# Patient Record
Sex: FEMALE | Race: WHITE
[De-identification: names, ages, dates, MRNs, and addresses within clinical notes are randomized per-mention and may not be internally consistent; named-entity substitution may affect disease eponyms.]

---

## 2021-01-22 ENCOUNTER — HOSPITAL ENCOUNTER (EMERGENCY)
Dept: HOSPITAL 11 - JP.ED | Age: 86
Discharge: HOME | End: 2021-01-22
Payer: MEDICARE

## 2021-01-22 DIAGNOSIS — I10: ICD-10-CM

## 2021-01-22 DIAGNOSIS — Z79.899: ICD-10-CM

## 2021-01-22 DIAGNOSIS — Z88.0: ICD-10-CM

## 2021-01-22 DIAGNOSIS — Z88.2: ICD-10-CM

## 2021-01-22 DIAGNOSIS — S20.211A: Primary | ICD-10-CM

## 2021-01-22 DIAGNOSIS — Z88.1: ICD-10-CM
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
## 2021-01-22 NOTE — EDM.PDOC
ED HPI GENERAL MEDICAL PROBLEM





- General


Chief Complaint: General


Stated Complaint: MEDICAL VIA NORTH


Time Seen by Provider: 01/22/21 16:56


Source of Information: Reports: Patient


History Limitations: Reports: No Limitations





- History of Present Illness


INITIAL COMMENTS - FREE TEXT/NARRATIVE: 





pt was exercising in her apartment and she lost her balance and fell. She hit 

her rt chest and is having alot of pain when she does deep breathes. 


Onset: Today, Sudden


Duration: Hour(s):


Location: Reports: Chest


Associated Symptoms: Reports: No Other Symptoms


  ** Right Middle Chest


Pain Score (Numeric/FACES): 10





- Related Data


                                    Allergies











Allergy/AdvReac Type Severity Reaction Status Date / Time


 


metronidazole [From Flagyl] Allergy  Paralysis Verified 04/14/14 07:45


 


Metronidazole HCl Allergy  Paralysis Verified 04/14/14 07:45





[From Flagyl]     


 


Penicillins Allergy  Cannot Verified 04/14/14 07:45





   Remember  


 


sulfamethoxazole Allergy  Hives Verified 04/14/14 07:45





[From Septra]     


 


trimethoprim [From Septra] Allergy  Hives Verified 04/14/14 07:45











Home Meds: 


                                    Home Meds





Hydrochlorothiazide/Lisinopril [Lisinopril-HCTZ 20-25 MG] 1 tab PO DAILY 

04/11/14 [History]


Metoprolol Tartrate [Lopressor] 50 mg PO BID 04/11/14 [History]


Omega-3-Acid Ethyl Esters [Lovaza] 1 tab PO DAILY 04/11/14 [History]


allopurinoL [Allopurinol] 100 mg PO DAILY 04/11/14 [History]


Cyclobenzaprine [Flexeril] 5 mg PO QID 01/22/21 [History]











Past Medical History


Cardiovascular History: Reports: Hypertension





- Infectious Disease History


Infectious Disease History: Reports: Chicken Pox, Measles, Mumps





Social & Family History





- Tobacco Use


Tobacco Use Status *Q: Never Tobacco User





- Caffeine Use


Caffeine Use: Reports: None





- Recreational Drug Use


Recreational Drug Use: No





ED ROS GENERAL





- Review of Systems


Review Of Systems: See Below


Constitutional: Reports: No Symptoms


HEENT: Reports: No Symptoms


Respiratory: Reports: Shortness of Breath, Other (pain with deep breathing. )


Cardiovascular: Reports: No Symptoms


Endocrine: Reports: No Symptoms


GI/Abdominal: Reports: No Symptoms


: Reports: No Symptoms


Musculoskeletal: Reports: Other ( tender over the rt chest. )


Skin: Reports: No Symptoms





ED EXAM, GENERAL





- Physical Exam


Exam: See Below


Free Text/Narrative:: 


t when she moves or takes a deep breath. 


Exam Limited By: No Limitations


General Appearance: Alert, Anxious, Moderate Distress


Ears: Normal TMs


Nose: Normal Inspection


Throat/Mouth: Normal Inspection


Head: Atraumatic


Neck: Normal Inspection


Respiratory/Chest: Splinting


Cardiovascular: Regular Rate, Rhythm


GI/Abdominal: Soft, Non-Tender


 (Female) Exam: Deferred


Rectal (Female) Exam: Deferred


Back Exam: Normal Inspection


Extremities: Normal Inspection





Course





- Vital Signs


Last Recorded V/S: 


                                Last Vital Signs











Temp  35.9 C L  01/22/21 16:38


 


Pulse  60   01/22/21 17:44


 


Resp  15   01/22/21 17:44


 


BP  171/78 H  01/22/21 17:44


 


Pulse Ox  96   01/22/21 17:44














- Orders/Labs/Meds


Orders: 


                               Active Orders 24 hr











 Category Date Time Status


 


 Ribs 2V w Chest Rt [CR] Stat Exams  01/22/21 16:53 Taken











Meds: 


Medications














Discontinued Medications














Generic Name Dose Route Start Last Admin





  Trade Name Yaredq  PRN Reason Stop Dose Admin


 


Ketorolac Tromethamine  60 mg  01/22/21 17:31  01/22/21 17:44





  Toradol  IM  01/22/21 17:32  60 mg





  ONETIME ONE   Administration














- Re-Assessments/Exams


Free Text/Narrative Re-Assessment/Exam: 





01/22/21 18:02


rib detail did not reveal any definite rib fractures obviously she could have a 

hairline fracture. 





Departure





- Departure


Time of Disposition: 17:56


Disposition: Home, Self-Care 01


Condition: Fair


Clinical Impression: 


 Chest wall contusion








- Discharge Information


Instructions:  Contusion, Easy-to-Read


Referrals: 


PCP,None [Primary Care Provider] - 


Forms:  ED Department Discharge


Care Plan Goals: 


cool pack to rt ant cest, tylenol 3 1 tab q6h prn for pain, pain meds can cause 

constipation, use prunes.  to keep stools soft.  encourage deep breathing, 

insentive spirometer. 





Sepsis Event Note (ED)





- Evaluation


Sepsis Screening Result: No Definite Risk





- Focused Exam


Vital Signs: 


                                   Vital Signs











  Temp Pulse Resp BP Pulse Ox


 


 01/22/21 17:44   60  15  171/78 H  96


 


 01/22/21 17:15   57 L   162/76 H 


 


 01/22/21 16:38  35.9 C L  70  17  168/86 H  96


 


 01/22/21 16:35  35.9 C L  70  17  168/86 H  96














- My Orders


Last 24 Hours: 


My Active Orders





01/22/21 16:53


Ribs 2V w Chest Rt [CR] Stat 














- Assessment/Plan


Last 24 Hours: 


My Active Orders





01/22/21 16:53


Ribs 2V w Chest Rt [CR] Stat

## 2021-01-24 ENCOUNTER — HOSPITAL ENCOUNTER (INPATIENT)
Dept: HOSPITAL 11 - JP.ED | Age: 86
LOS: 1 days | Discharge: NURSING, REHAB OR LONG-TERM CARE | DRG: 551 | End: 2021-01-25
Attending: INTERNAL MEDICINE | Admitting: FAMILY MEDICINE
Payer: MEDICARE

## 2021-01-24 DIAGNOSIS — I21.4: ICD-10-CM

## 2021-01-24 DIAGNOSIS — E78.5: ICD-10-CM

## 2021-01-24 DIAGNOSIS — Z88.0: ICD-10-CM

## 2021-01-24 DIAGNOSIS — E78.00: ICD-10-CM

## 2021-01-24 DIAGNOSIS — M19.90: ICD-10-CM

## 2021-01-24 DIAGNOSIS — E87.1: ICD-10-CM

## 2021-01-24 DIAGNOSIS — Z88.8: ICD-10-CM

## 2021-01-24 DIAGNOSIS — Z88.2: ICD-10-CM

## 2021-01-24 DIAGNOSIS — M85.80: ICD-10-CM

## 2021-01-24 DIAGNOSIS — M10.9: ICD-10-CM

## 2021-01-24 DIAGNOSIS — S22.080A: ICD-10-CM

## 2021-01-24 DIAGNOSIS — Z66: ICD-10-CM

## 2021-01-24 DIAGNOSIS — R00.1: ICD-10-CM

## 2021-01-24 DIAGNOSIS — I10: ICD-10-CM
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
## 2021-01-24 DIAGNOSIS — K21.9: ICD-10-CM

## 2021-01-24 DIAGNOSIS — S22.070A: Primary | ICD-10-CM

## 2021-01-24 NOTE — HP
IDENTIFYING DATA:  Salena Pompa is an 87-year-old   female from [place], [place].

 

CHIEF COMPLAINT:  Back pain.

 

HISTORY OF PRESENT ILLNESS:  An elderly female who lives independently in a senior apartment

dwelling, has had episodes of self-reported dizziness and recurrent falls without loss of

consciousness.  General unsteadiness and weakness are noted with occasional use of a walker

as an ambulatory aid.  She was seen earlier in the week with a fall and right chest

contusion, without bony fractures.  She had a fall today to a supine position and presented

with mid to lower back pain.  She has had no radiation of pain to the cervical area, upper

or lower extremities.  No paresthesias noted.  Chronic mild stiffness of the neck is of long-

standing duration.  Given pain and inability to care for self, she was transported by

[facility] to the emergency room for evaluation.

 

PAST MEDICAL HISTORY:  Previous surgeries include bilateral cataract extraction and tubal

ligation.

 

Additional health problems include hyperlipidemia, hypertension, osteopenia, and

osteoarthritis.

 

HABITS:  Nonsmoker.  No alcohol use.  No regular use of caffeinated beverages.

 

ALLERGIES:  REPORTED TO METRONIDAZOLE, PENICILLIN, AND SULFAMETHOXAZOLE.

 

 

CURRENT MEDICATIONS:  Lisinopril with hydrochlorothiazide 20/25 mg daily, metoprolol

tartrate 50 mg b.i.d., omega-3 fatty acid tablets 1 daily, allopurinol 100 mg daily for

gouty arthritis, cyclobenzaprine 5 mg q.i.d. p.r.n., and acetaminophen p.r.n. arthritic

pain.

 

IMMUNIZATIONS:  She generally refuses influenza vaccines.  Reports she has received previous

pneumococcal vaccine and 1 of 2 COVID vaccines today.

 

SOCIAL HISTORY:  Currently resides at [facility].  She has home

care and nursing assistance provided by [facility] with weekly nursing visits and monthly

cleaning provided.  She no longer drives.  She does have an attendant to provide

transportation for appointments and grocery shopping.  She reports she does her own laundry

and prepares 2 of 3 meals daily, participating in provided lunch.

 

FAMILY HISTORY:  Children do not live in the immediate area.  No close relatives in the [place] region at the current time.

 

REVIEW OF SYSTEMS:  NEUROLOGIC:  Does admit to mild memory loss and denies significant

hearing decline.  No history of stroke, seizures, headaches, or focal weakness.  Status post

cataract extraction.  She is able to read standard print.

CARDIAC:  History of hypertension and hyperlipidemia.  Denies history of diabetes, MI,

angina-like pain, or syncope.  She reports a history of childhood murmur.

RESPIRATORY:  No history of tuberculosis, chronic asthmatic lung disease, COPD, or noted

COVID disease.  No cough, sputum production, or shortness of breath.

GASTROINTESTINAL:  Denies dyspepsia, hepatitis, jaundice, gallbladder disease, or bowel

changes.  No melena or hematochezia.  Occasional intermittent diarrhea noted with ingestion

of fatty foods.

GENITOURINARY:  No urinary incontinence.  Records suggest a history renal insufficiency.

MUSCULOSKELETAL:  Arthralgias at the knees, chronic mild cervical stiffness, and new onset

of mid to low back pain.

 

PHYSICAL EXAMINATION:

GENERAL:  Appearance is that of an elderly female, now appearing comfortable, lying in her

hospital bed.

VITAL SIGNS:  Initial vitals; temperature 98.7 degrees Fahrenheit, pulse 91, respiratory

rate 20, blood pressure 180/78, and O2 sats 93% on room air.

HEENT:  Hearing is intact with normal canals and TMs.  Extraocular eye movements are

symmetrical.  Sclerae are anicteric.  No nasal congestion.  No facial asymmetries or trauma.

NECK:  Brisk, regular carotid pulses.  No bruits, JVD, or thyromegaly.

LUNGS:  Symmetrical, clear, resonant, and non-tachypneic.

HEART:  Regular without murmurs or gallops currently noted.

ABDOMEN:  Soft, nontender, and nondistended.  No organomegaly.  Active sounds.  Good femoral

pulses.  No abdominal bruits.  No CVA pain.

EXTREMITIES:  Good radial, posterior tibial, and dorsal pedal pulses.  No pitting edema.  No

ischemic skin changes.  No contusions or open skin lesions.

NEUROLOGIC:  Symmetrical strength.  Some distractibility and difficulty engaging in

conversation secondary to immediate recall and short-term memory deficit.  No agitation,

anxiety, or depressive symptoms.

 

IMAGING:  CT imaging of the thoracolumbar spine on admission suggest recent thoracic

vertebral compression fractures, likely new in presentation.

 

LABORATORY DATA:  Labs of January 2021 include a creatinine is 0.98, cholesterol 161, LDL

88, glucose 95, potassium 4, and BUN 18.  WBC 9.1 and hemoglobin 15.3.  Sodium 132.

 

IMPRESSION:

1. Fall with thoracic vertebral compression fractures and back pain.

2. Recurrent falls, unknown etiology.  The patient reports episodes of transient

    dizziness.

3. Degenerative arthritis.

4. Hypertension.

5. Hyperlipidemia.

6. Osteoarthritis and osteopenia.

7. Reported history of gouty arthritis.

 

PLAN:  With the patient's acute injury in ongoing discomfort with inability to care for

self, she is unable to be discharged to home.  She will be admitted to outpatient status

with assistance provided with ADLs, heat to the back, analgesics in the form of

acetaminophen or hydrocodone with acetaminophen will be provided.  PT and OT assessments

requested.  If she continues to show deficits and is unable to resume independent living,

recuperative and rehab stay in the nursing home may be necessary.  The patient is agreeable

to this if required.  DNR/DNI status will be instituted per patient's expressed desire.

Provide 2 g sodium diet and maintain on regular home medications.

 

 

 

 

Abhi Livingston MD

DD:  01/24/2021 23:08:36

DT:  01/24/2021 23:39:05

Job #:  [id number]

## 2021-01-24 NOTE — CRLCT
INDICATION:



Trauma. Back pain.



TECHNIQUE:



Axial images. Sagittal and coronal reconstructions.



COMPARISON:



None available.



FINDINGS:



There is no lumbar spine fracture.



There is minor anterolisthesis at L4-5.



Multilevel disc degeneration, most significant at L4-5 where there is 

moderate to advanced disc height loss, low-grade circumferential disc 

bulge, and vacuum disc phenomena.



Multilevel facet arthrosis is noted, most significant at L3-4, L4-5 and 

L5-S1.



Atherosclerotic changes. No abdominal aortic aneurysm.



IMPRESSION:



1. No acute fracture involving the lumbar spine.



2. Lumbar spondylosis as described above.



Please note that all CT scans at this facility use dose modulation, 

iterative reconstruction, and/or weight-based dosing when appropriate to 

reduce radiation dose to as low as reasonably achievable.



Dictated by Roshan Mcdonald MD @ Jan 25 2021  9:33AM



Signed by Dr. Roshan Mcdonald @ Jan 25 2021 11:52AM

## 2021-01-24 NOTE — EDM.PDOC
ED HPI GENERAL MEDICAL PROBLEM





- General


Chief Complaint: Back Pain or Injury


Stated Complaint: MED VIA NORTH


Time Seen by Provider: 01/24/21 20:00


Source of Information: Reports: Patient, EMS


History Limitations: Reports: No Limitations





- History of Present Illness


INITIAL COMMENTS - FREE TEXT/NARRATIVE: 





87-year-old female who is fallen several times at home in the last few days, was

evaluated in the emergency room 3 days ago with chest wall contusion.  She fell 

2 more times today, one hard on her back and now she has significant back pain. 

She has some chronic back pain but it is markedly worse.  EMS was called for a 

"lift assist" but found her in much worse condition than her baseline so brought

her in for evaluation.  She has significant tenderness in the mid and lower 

back.  No nausea or vomiting, chest pain, shortness of breath or head injury.


Onset: Sudden (Sudden increase in back pain after falls today)


Location: Reports: Back


Associated Symptoms: Reports: No Other Symptoms.  Denies: Confusion, Chest Pain,

Cough, Loss of Appetite, Shortness of Breath, Weakness


Treatments PTA: Reports: See EMS Report


  ** Middle Back


Pain Score (Numeric/FACES): 8





- Related Data


                                    Allergies











Allergy/AdvReac Type Severity Reaction Status Date / Time


 


metronidazole [From Flagyl] Allergy  Paralysis Verified 01/24/21 20:56


 


Metronidazole HCl Allergy  Paralysis Verified 01/24/21 20:56





[From Flagyl]     


 


Penicillins Allergy  Cannot Verified 01/24/21 20:56





   Remember  


 


sulfamethoxazole Allergy  Hives Verified 01/24/21 20:56





[From Septra]     


 


trimethoprim [From Septra] Allergy  Hives Verified 01/24/21 20:56











Home Meds: 


                                    Home Meds





Hydrochlorothiazide/Lisinopril [Lisinopril-HCTZ 20-25 MG] 1 tab PO DAILY 

04/11/14 [History]


Metoprolol Tartrate [Lopressor] 50 mg PO BID 04/11/14 [History]


Omega-3-Acid Ethyl Esters [Lovaza] 1 tab PO DAILY 04/11/14 [History]


allopurinoL [Allopurinol] 100 mg PO DAILY 04/11/14 [History]


Cyclobenzaprine [Flexeril] 5 mg PO QID PRN 01/22/21 [History]











Past Medical History


Cardiovascular History: Reports: Arrhythmia, High Cholesterol, Hypertension


Other Cardiovascular History: frequent PVC's


Gastrointestinal History: Reports: GERD


OB/GYN History: Reports: Pregnancy


Musculoskeletal History: Reports: Back Pain, Chronic, Osteoarthritis


Endocrine/Metabolic History: Reports: Hypokalemia





- Infectious Disease History


Infectious Disease History: Reports: Chicken Pox, Measles, Mumps





- Past Surgical History


GI Surgical History: Reports: Colonoscopy


Female  Surgical History: Reports: Tubal Ligation





Social & Family History





- Tobacco Use


Tobacco Use Status *Q: Never Tobacco User


Second Hand Smoke Exposure: No





- Caffeine Use


Caffeine Use: Reports: None





- Recreational Drug Use


Recreational Drug Use: No





ED ROS GENERAL





- Review of Systems


Review Of Systems: See Below


Constitutional: Denies: Fever, Chills, Malaise


HEENT: Reports: No Symptoms


Respiratory: Denies: Shortness of Breath


Cardiovascular: Denies: Chest Pain


GI/Abdominal: Denies: Abdominal Pain, Nausea, Vomiting


Skin: Denies: Bruising


Neurological: Denies: Paresthesia


Psychiatric: Reports: No Symptoms





ED EXAM,LOWER BACK PAIN/INJURY





- Physical Exam


Exam: See Below


Exam Limited By: No Limitations


General Appearance: Alert, No Apparent Distress (Uncomfortable when lying still 

but not distressed, very difficult to move even with assistance)


Eye Exam: Bilateral Eye: EOMI


Head: Atraumatic


Neck: Supple, Non-Tender


Respiratory/Chest: Lungs Clear


Cardiovascular: Regular Rate, Rhythm


Back Exam: Vertebral Tenderness (Some mild increased tenderness to percussion of

 the mid thoracic spine and palpation of the paralumbar muscles)


Neurological: Alert, No Motor/Sensory Deficits, Oriented x 3


Psychiatric: Normal Affect, Normal Mood


Skin Exam: Warm, Dry





Course





- Vital Signs


Last Recorded V/S: 


                                Last Vital Signs











Temp  98.7 F   01/24/21 22:24


 


Pulse  91   01/24/21 22:24


 


Resp  20   01/24/21 22:24


 


BP  180/78 H  01/24/21 22:24


 


Pulse Ox  93 L  01/24/21 22:24














- Orders/Labs/Meds


Orders: 


                               Active Orders 24 hr











 Category Date Time Status


 


 Sodium Chloride 0.9% [Saline Flush] Med  01/24/21 21:11 Active





 10 ml FLUSH ASDIRECTED PRN   


 


 Saline Lock Insert [OM.PC] Routine Oth  01/24/21 21:11 Ordered








                                Medication Orders





Acetaminophen (Tylenol)  650 mg PO Q4H PRN


   PRN Reason: Pain


Hydrocodone Bitart/Acetaminophen (Norco 325-5 Mg)  1 tab PO Q4H PRN


   PRN Reason: Pain


Allopurinol (Zyloprim)  100 mg PO DAILY ANDREW


Docusate Sodium (Colace)  100 mg PO DAILY ANDREW


Metoprolol Tartrate (Lopressor)  50 mg PO BID ANDREW


Non-Formulary Medication (Hydrochlorothiazide/Lisinopril [Lisinopril-Hctz 20-25 

Mg])  1 tab PO DAILY ANDREW


Sodium Chloride (Saline Flush)  10 ml FLUSH ASDIRECTED PRN


   PRN Reason: Keep Vein Open


   Last Admin: 01/24/21 22:10  Dose: 10 ml


   Documented by: MARIXA








Meds: 


Medications











Generic Name Dose Route Start Last Admin





  Trade Name Freq  PRN Reason Stop Dose Admin


 


Acetaminophen  650 mg  01/24/21 22:16 





  Tylenol  PO  





  Q4H PRN  





  Pain  


 


Hydrocodone Bitart/Acetaminophen  1 tab  01/24/21 22:16 





  Norco 325-5 Mg  PO  





  Q4H PRN  





  Pain  


 


Allopurinol  100 mg  01/25/21 09:00 





  Zyloprim  PO  





  DAILY ANDREW  


 


Docusate Sodium  100 mg  01/25/21 09:00 





  Colace  PO  





  DAILY ANDREW  


 


Metoprolol Tartrate  50 mg  01/25/21 09:00 





  Lopressor  PO  





  BID ANDREW  


 


Non-Formulary Medication  1 tab  01/25/21 09:00 





  Hydrochlorothiazide/Lisinopril [Lisinopril-Hctz 20-25 Mg]  PO  





  DAILY ANDREW  


 


Sodium Chloride  10 ml  01/24/21 21:11  01/24/21 22:10





  Saline Flush  FLUSH   10 ml





  ASDIRECTED PRN   Administration





  Keep Vein Open  














Discontinued Medications














Generic Name Dose Route Start Last Admin





  Trade Name Freq  PRN Reason Stop Dose Admin


 


Fentanyl  25 mcg  01/24/21 21:51  01/24/21 22:05





  Sublimaze  IVPUSH  01/24/21 21:52  25 mcg





  ONETIME ONE   Administration














- Re-Assessments/Exams


Free Text/Narrative Re-Assessment/Exam: 





01/24/21 21:39


CT of the thoracic and lumbar spine was obtained and showed compression 

fractures which are very possibly new, we do not have previous to compare.  The 

results are pending and will be inserted below when available.  This patient is 

still living independently and is unable to go home, Dr. Livingston will admit her 

to observation status to initiate rehab.


01/24/21 22:03


PRELIMINARY IMPRESSION:


1. Acute mild superior endplate compression fracture at T10, with approximately 

25 percent anterior vertebral body height loss and 15 percent posterior 

vertebral body height loss.


2. Acute minor superior endplate compression fracture of T12 with approximately 

10-15 percent vertebral body height loss.


3. No other acute thoracic spine compression fracture is identified. Remote 

appearing mild T5 and T6 superior endplate compression fractures.


4. Extra-spinal findings will be discussed in the final report.





Dictated by Roshan Mcdonald MD @ 1/24/2021 9:49:45 PM





01/24/21 22:04


Patient was given 25 mcg of fentanyl IV prior to transfer to floor.





Departure





- Departure


Time of Disposition: 22:27


Disposition: Admitted As Inpatient 66


Clinical Impression: 


Compression fx, thoracic spine


Qualifiers:


 Encounter type: initial encounter Thoracic vertebra fracture level: T10 

Qualified Code(s): S22.070A - Wedge compression fracture of T9-T10 vertebra, 

initial encounter for closed fracture








- Discharge Information





Sepsis Event Note (ED)





- Evaluation


Sepsis Screening Result: No Definite Risk





- Focused Exam


Vital Signs: 


                                   Vital Signs











  Temp Pulse Resp BP Pulse Ox


 


 01/24/21 21:25  98.2 F  78  20  155/66 H  92 L


 


 01/24/21 20:58   78  20  155/66 H  92 L


 


 01/24/21 20:38   39 L  21 H  176/72 H  96


 


 01/24/21 20:00   37 L  18  168/66 H  96


 


 01/24/21 19:43  98.2 F  78  20  168/61 H  93 L














- My Orders


Last 24 Hours: 


My Active Orders





01/24/21 21:11


Sodium Chloride 0.9% [Saline Flush]   10 ml FLUSH ASDIRECTED PRN 


Saline Lock Insert [OM.PC] Routine 














- Assessment/Plan


Last 24 Hours: 


My Active Orders





01/24/21 21:11


Sodium Chloride 0.9% [Saline Flush]   10 ml FLUSH ASDIRECTED PRN 


Saline Lock Insert [OM.PC] Routine

## 2021-01-24 NOTE — CRLCT
Indication:



Back pain. Fall.



Technique:



Axial images. Sagittal and coronal reconstructions.



Comparison:



None.



Findings:



There are remote appearing mild superior endplate compression 

deformities/fractures involving the T5 and T6 vertebral bodies.



There is an acute appearing mild compression fracture involving the 

superior aspect of the T10 vertebral body. There is approximately 25% loss 

of anterior vertebral body height and 15% loss of the posterior vertebral 

body height at this level. The fracture does extend to the posterior margin 

of the vertebral body although there is no significant posterior 

displacement.



There is also an acute minor compression fracture involving the superior 

aspect of the T12 vertebral body, with approximately 10-15% overall 

vertebral body height loss.



Multilevel disc degeneration is noted. There does appear to be a posterior 

central disc protrusion at T9-10, which results in at least mild spinal 

canal stenosis at this level. This is suboptimally assessed with CT. 



Mild cardiomegaly. Atherosclerotic changes. Aortic valve calcifications. 

Old healed granulomatous disease. Mild bibasilar atelectasis. Low 

attenuation left renal mass is suboptimally evaluated. If this has not been 

previously evaluated, this could be further assessed with an ultrasound.



Impression:



1. Acute compression fractures involving the T10 and T12 vertebral bodies 

as described above.



2. Remote T5 and T6 compression fractures. 



3. Disc protrusion at T9-10 resulting in at least mild spinal stenosis.



4. Indeterminate left renal mass. Consider further imaging assessment if 

this has not been previously evaluated. 



5. Other findings as noted.



Please note that all CT scans at this facility use dose modulation, 

iterative reconstruction, and/or weight-based dosing when appropriate to 

reduce radiation dose to as low as reasonably achievable.



Dictated by Roshan Mcdonald MD @ Jan 25 2021  7:58AM



Signed by Dr. Roshan Mcdonald @ Jan 25 2021 12:07PM

## 2021-01-25 RX ADMIN — HYDROCODONE BITARTRATE AND ACETAMINOPHEN PRN TAB: 5; 325 TABLET ORAL at 00:38

## 2021-01-25 RX ADMIN — HYDROCODONE BITARTRATE AND ACETAMINOPHEN PRN TAB: 5; 325 TABLET ORAL at 04:54

## 2021-01-25 RX ADMIN — HYDROCODONE BITARTRATE AND ACETAMINOPHEN PRN TAB: 5; 325 TABLET ORAL at 10:03

## 2021-01-25 NOTE — PCM.DCSUM1
**Discharge Summary





- Hospital Course


Brief History: 87 yr old female with history of essential hypertension who 

presented with back pain after an episode of syncope at home.  She was admitted 

for pain control with 2 small compression fractures, 1 at T10 and 1 at T12.


Diagnosis: Stroke: No





- Discharge Data


Discharge Date: 01/25/21


Discharge Disposition: DC/Tfer to Acute Hospital 02


Condition: Stable





- Referral to Home Health


Primary Care Physician: 


Chago Delvalle MD








- Discharge Diagnosis/Problem(s)


(1) NSTEMI (non-ST elevated myocardial infarction)


SNOMED Code(s): 66363804


   ICD Code: I21.4 - NON-ST ELEVATION (NSTEMI) MYOCARDIAL INFARCTION   Status: 

Acute   Current Visit: Yes   





(2) Symptomatic bradycardia


SNOMED Code(s): 73834756, 029068129


   ICD Code: R00.1 - BRADYCARDIA, UNSPECIFIED   Status: Acute   Current Visit: 

Yes   





(3) Compression fx, thoracic spine


SNOMED Code(s): 442500825


   ICD Code: S22.000A - WEDGE COMPRESSION FRACTURE OF UNSP THORACIC VERTEBRA, 

INIT   Status: Acute   Current Visit: Yes   


Qualifiers: 


   Encounter type: initial encounter   Thoracic vertebra fracture level: T10   

Qualified Code(s): S22.070A - Wedge compression fracture of T9-T10 vertebra, 

initial encounter for closed fracture   





(4) Compression fracture of T12 vertebra


SNOMED Code(s): 146076379


   ICD Code: S22.080A - WEDGE COMPRESSION FRACTURE OF T11-T12 VERTEBRA, INIT   

Status: Acute   Current Visit: Yes   


Qualifiers: 


   Encounter type: initial encounter   Qualified Code(s): S22.080A - Wedge 

compression fracture of T11-T12 vertebra, initial encounter for closed fracture 

 





- Patient Summary/Data


Consults: 


                                  Consultations





01/24/21 22:19


Consult to Occupational Therapy [OT Evaluation and Treatment] [CONS] Routine 


   Please Evaluate and Treat.


   OT Reason for Consult: Discharge Planning


   Pending Discharge: No


   This query below is only for informational purposes and is not editable.


   Admission Diagnosis/Problem: Pain in cervical spine


PT Evaluation and Treatment [CONS] Routine 


   Please Evaluate and Treat.


   PT Reason for Consult: weakness and falls


   Pending Discharge: No


   This query below is only for informational purposes and is not editable.


   Admission Diagnosis/Problem: Pain in cervical spine











Hospital Course: 





Salena presented to the emergency room with back pain after a syncopal episode at 

home.  Work-up in the emergency room included imaging studies that showed a 

small compression fracture at T10 and T12.  The patient was admitted to the 

hospital on observation for pain control.  Cardiac monitoring overnight showed 

bigeminy with a heart rate around 80.  Laboratory studies were obtained the next

 morning which showed mild hyponatremia and mild leukocytosis.  Kidney function 

was normal.  Urinalysis was not suggestive of infection.  An EKG was obtained 

and this did show bigeminy but no acute changes.  Throughout the course of the 

day the patient was relatively stable and had good control of her back pain.  As

 the day progressed she started to have episodes that lasted from 10 to 15 

seconds where her heart rate would drop down to the mid to upper 30s.  When she 

had these episodes she felt nauseated and a little dizzy even laying in bed.  I 

did talk to the on-call cardiologist at [facility].  We discussed 

additional work-up including a stress test.  We did complete an echocardiogram 

which showed an ejection fraction of 50% and minor valvular abnormalities as 

well as elevated pulmonary pressures.  Later in the evening the patient had an 

episode of chest pain.  An EKG was obtained and did not show any acute ST 

changes.  I again talked to on-call cardiology as well as the hospitalist at 

[facility].  After my discussion about transfer a troponin level was 

drawn and did return positive at 0.233.  The patient was given 324 mg of aspirin

 as well as a 4000 unit bolus of heparin and a heparin drip was started. The 

patient is going to be transferred to [facility] for management of a non-ST 

elevation myocardial infarction as well as symptomatic bradycardia with bigeminy

 and a very high PVC burden.  Patient has been in bigeminy throughout the course

 of her hospital stay with about 99% of the time being in bigeminy with only 

short runs of a sinus rhythm.  Patient is stable for transport at this time and 

I think that the benefits of transfer far outweigh the risks.  Her son Jad 

was updated.





Michele Street [phone]





- Patient Instructions


Other/Special Instructions: transfer to [facility], symptomatic bradycardia,

 NSTEMI





- Discharge Plan


*PRESCRIPTION DRUG MONITORING PROGRAM REVIEWED*: Not Applicable


*COPY OF PRESCRIPTION DRUG MONITORING REPORT IN PATIENT [facility]: Not Applicable


Home Medications: 


                                    Home Meds





Hydrochlorothiazide/Lisinopril [Lisinopril-HCTZ 20-25 MG] 1 tab PO DAILY 

04/11/14 [History]


Metoprolol Tartrate [Lopressor] 50 mg PO BID 04/11/14 [History]


Omega-3-Acid Ethyl Esters [Lovaza] 1 tab PO DAILY 04/11/14 [History]


allopurinoL [Allopurinol] 100 mg PO DAILY 04/11/14 [History]


Cyclobenzaprine [Flexeril] 10 mg PO BEDTIME PRN 01/22/21 [History]


Lutein 6 mg PO DAILY 01/25/21 [History]








Forms:  ED Department Discharge


Referrals: 


Chago Delvalle MD [Primary Care Provider] - 





- Discharge Summary/Plan Comment


DC Time >30 min.: Yes (75-transfer to acute hospital )





- Patient Data


Vitals - Most Recent: 


                                Last Vital Signs











Temp  35.7 C L  01/25/21 18:47


 


Pulse  80   01/25/21 18:47


 


Resp  18   01/25/21 18:47


 


BP  161/69 H  01/25/21 18:47


 


Pulse Ox  91 L  01/25/21 18:47











Weight - Most Recent: 86.183 kg


I&O - Last 24 hours: 


                                 Intake & Output











 01/25/21 01/25/21 01/25/21





 06:59 14:59 22:59


 


Intake Total 550 1620 600


 


Output Total 600 650 100


 


Balance -50 970 500











Lab Results - Last 24 hrs: 


                         Laboratory Results - last 24 hr











  01/25/21 01/25/21 01/25/21 Range/Units





  08:22 08:22 11:12 


 


WBC  11.6 H    (4.5-11.0)  K/uL


 


RBC  4.54    (3.30-5.50)  M/uL


 


Hgb  14.2    (12.0-15.0)  g/dL


 


Hct  41.6    (36.0-48.0)  %


 


MCV  92    (80-98)  fL


 


MCH  31    (27-31)  pg


 


MCHC  34    (32-36)  %


 


Plt Count  180    (150-400)  K/uL


 


Neut % (Auto)  57    (36-66)  %


 


Lymph % (Auto)  32    (24-44)  %


 


Mono % (Auto)  8 H    (2-6)  %


 


Eos % (Auto)  2    (2-4)  %


 


Baso % (Auto)  0    (0-1)  %


 


Sodium   128 L   (140-148)  mmol/L


 


Potassium   5.0   (3.6-5.2)  mmol/L


 


Chloride   94 L   (100-108)  mmol/L


 


Carbon Dioxide   28   (21-32)  mmol/L


 


Anion Gap   11.0   (5.0-14.0)  mmol/L


 


BUN   19 H   (7-18)  mg/dL


 


Creatinine   0.9   (0.6-1.0)  mg/dL


 


Est Cr Clr Drug Dosing   34.83   mL/min


 


Estimated GFR (MDRD)   59 L   (>60)  


 


Glucose   115 H   ()  mg/dL


 


Calcium   9.3   (8.5-10.1)  mg/dL


 


Troponin I     (0.000-0.056)  ng/mL


 


Urine Color    Yellow  (YELLOW)  


 


Urine Appearance    Clear  (CLEAR)  


 


Urine pH    5.5  (5.0-8.0)  


 


Ur Specific Gravity    1.015  (1.008-1.030)  


 


Urine Protein    Negative  (NEGATIVE)  mg/dL


 


Urine Glucose (UA)    Negative  (NEGATIVE)  mg/dL


 


Urine Ketones    Negative  (NEGATIVE)  mg/dL


 


Urine Occult Blood    Trace-intact H  (NEGATIVE)  


 


Urine Nitrite    Negative  (NEGATIVE)  


 


Urine Bilirubin    Negative  (NEGATIVE)  


 


Urine Urobilinogen    0.2  (0.2-1.0)  EU/dL


 


Ur Leukocyte Esterase    Negative  (NEGATIVE)  


 


Urine RBC    0-5  (0-5)  


 


Urine WBC    0-5  (0-5)  


 


Ur Epithelial Cells    Rare  














  01/25/21 Range/Units





  18:23 


 


WBC   (4.5-11.0)  K/uL


 


RBC   (3.30-5.50)  M/uL


 


Hgb   (12.0-15.0)  g/dL


 


Hct   (36.0-48.0)  %


 


MCV   (80-98)  fL


 


MCH   (27-31)  pg


 


MCHC   (32-36)  %


 


Plt Count   (150-400)  K/uL


 


Neut % (Auto)   (36-66)  %


 


Lymph % (Auto)   (24-44)  %


 


Mono % (Auto)   (2-6)  %


 


Eos % (Auto)   (2-4)  %


 


Baso % (Auto)   (0-1)  %


 


Sodium   (140-148)  mmol/L


 


Potassium   (3.6-5.2)  mmol/L


 


Chloride   (100-108)  mmol/L


 


Carbon Dioxide   (21-32)  mmol/L


 


Anion Gap   (5.0-14.0)  mmol/L


 


BUN   (7-18)  mg/dL


 


Creatinine   (0.6-1.0)  mg/dL


 


Est Cr Clr Drug Dosing   mL/min


 


Estimated GFR (MDRD)   (>60)  


 


Glucose   ()  mg/dL


 


Calcium   (8.5-10.1)  mg/dL


 


Troponin I  0.233 H*  (0.000-0.056)  ng/mL


 


Urine Color   (YELLOW)  


 


Urine Appearance   (CLEAR)  


 


Urine pH   (5.0-8.0)  


 


Ur Specific Gravity   (1.008-1.030)  


 


Urine Protein   (NEGATIVE)  mg/dL


 


Urine Glucose (UA)   (NEGATIVE)  mg/dL


 


Urine Ketones   (NEGATIVE)  mg/dL


 


Urine Occult Blood   (NEGATIVE)  


 


Urine Nitrite   (NEGATIVE)  


 


Urine Bilirubin   (NEGATIVE)  


 


Urine Urobilinogen   (0.2-1.0)  EU/dL


 


Ur Leukocyte Esterase   (NEGATIVE)  


 


Urine RBC   (0-5)  


 


Urine WBC   (0-5)  


 


Ur Epithelial Cells   











Med Orders - Current: 


                               Current Medications





Acetaminophen (Tylenol Extra Strength)  1,000 mg PO TID [facility]


   Last Admin: 01/25/21 13:11 Dose:  1,000 mg


   Documented by: 


Allopurinol (Zyloprim)  100 mg PO DAILY [facility]


   Last Admin: 01/25/21 08:50 Dose:  100 mg


   Documented by: 


Heparin Sodium/Dextrose (Heparin 25,000 Units In D5w 500 Ml)  25,000 units in 

500 mls @ 20.684 mls/hr IV TITRATE [facility]; Protocol


Magnesium Hydroxide (Milk Of Magnesia)  30 ml PO BID PRN


   PRN Reason: Constipation


   Last Admin: 01/25/21 10:57 Dose:  30 ml


   Documented by: 


Metoprolol Tartrate (Lopressor)  50 mg PO BID [facility]


   Last Admin: 01/25/21 10:53 Dose:  Not Given


   Documented by: 


Metoprolol Tartrate (Lopressor)  25 mg PO Q12H [facility]


Oxycodone HCl (Oxycodone)  5 mg PO Q4H PRN


   PRN Reason: Pain


Senna/Docusate Sodium (Senna Plus)  1 tab PO BID PRN


   PRN Reason: Constipation


   Last Admin: 01/25/21 10:57 Dose:  1 tab


   Documented by: 


Sodium Chloride (Saline Flush)  10 ml FLUSH ASDIRECTED PRN


   PRN Reason: Keep Vein Open


   Last Admin: 01/24/21 22:10 Dose:  10 ml


   Documented by: 





Discontinued Medications





Acetaminophen (Tylenol)  650 mg PO Q4H PRN


   PRN Reason: Pain


   Last Admin: 01/25/21 00:38 Dose:  650 mg


   Documented by: 


Hydrocodone Bitart/Acetaminophen (Norco 325-5 Mg)  1 tab PO Q4H PRN


   PRN Reason: Pain


   Last Admin: 01/25/21 10:03 Dose:  1 tab


   Documented by: 


Aspirin (Aspirin)  324 mg PO ONETIME STA


   Stop: 01/25/21 19:00


Diltiazem HCl (Cardizem Cd)  120 mg PO ONETIME ONE


   Stop: 01/25/21 15:01


   Last Admin: 01/25/21 16:09 Dose:  Not Given


   Documented by: 


Docusate Sodium (Colace)  100 mg PO DAILY [facility]


   Last Admin: 01/25/21 08:49 Dose:  100 mg


   Documented by: 


Fentanyl (Sublimaze)  25 mcg IVPUSH ONETIME ONE


   Stop: 01/24/21 21:52


   Last Admin: 01/24/21 22:05 Dose:  25 mcg


   Documented by: 


Heparin Sodium (Porcine) (Heparin Sodium)  4,000 units IVPUSH .BOLUS ONE


   Stop: 01/25/21 19:00


Hydrochlorothiazide (Hydrochlorothiazide)  25 mg PO DAILY [facility]


   Last Admin: 01/25/21 11:34 Dose:  Not Given


   Documented by: 


Lisinopril (Prinivil)  20 mg PO DAILY [facility]


   Last Admin: 01/25/21 10:57 Dose:  20 mg


   Documented by: 


Non-Formulary Medication (Hydrochlorothiazide/Lisinopril [Lisinopril-Hctz 20-25 

Mg])  1 tab PO DAILY [facility]

## 2021-01-25 NOTE — CR
Ribs 2V w Chest Rt

 

CLINICAL HISTORY: Pain, fall

 

FINDINGS: There is no acute fracture within the ribs. No destructive changes are

seen. There is no focal pleural thickening or obvious effusion. There are

atherosclerotic changes in the aorta.

 

IMPRESSION: Negative right ribs.

## 2023-01-13 NOTE — PN
DATE OF SERVICE:  01/25/2021

 

SUBJECTIVE:  An 87-year-old female who resides independently in a senior apartment dwelling,

is seen today for followup of complaints of mid to low back pain.  She has had recent

admitted recurrent falls with right anterior chest contusion as well as development of mid

to low back pain yesterday evening following a fall.  Etiology of falls is unknown.  She

attributes this to recurrent episodes of dizziness without loss of consciousness.

Additionally, she has noted history of osteoarthritis of the knees and ambulates with the

use of a walker on an intermittent basis.

 

She has had no chest pain, palpitations, or unusual shortness of breath.  No GI or 

complaints.  No radiating pain to the lower extremities.  Through the nighttime hours, she

is resting comfortably in bed with use of analgesic therapy.  Has not required ambulation to

the bathroom.

 

OBJECTIVE:  VITAL SIGNS:  Blood pressure 136/43, respiratory rate 16 with O2 sats of 92% on

room air, pulse rate 70.  Telemetry reveals frequent PVCs with bigeminal pattern.  No other

tachy or yonny dysrhythmia.  Temperature 36.3 degrees centigrade.

GENERAL:  The patient is conversant, talkative, comfortable in appearance.

LUNGS:  Clear.

HEART:  Regular without gallops.

BACK:  Notes midback pain with repositioning or movement in bed.

 

IMPRESSION AND PLANS:

1. Recurrent falls with mid to low back pain.  CT imaging on presentation revealed

    thoracic vertebral compression fractures, is likely etiology of falls.  We will

    continue with analgesic therapy and assistance with performance of activities of daily

    living and request Physical Therapy/Occupational Therapy assessment with further

    decision making and disposition to follow.  Likely will require time spent in the

    nursing home setting as she recovers from her back injury.

2. Recurrent falls, unknown etiology, may be secondary to instability.  I will continue to

    monitor in telemetry to exclude cardiac dysrhythmia as etiology of her reported

    dizziness.

3. Chronic degenerative arthritis.  Does have acetaminophen available to use on a p.r.n.

    basis.

4. Hypertension and hyperlipidemia by history.  Currently stable.  Continue with regular

    medications.

 

 

 

 

Abhi Livingston MD

DD:  01/25/2021 04:11:35

DT:  01/25/2021 04:24:26

Job #:  [id number] Problem: Potential for Falls  Goal: Patient will remain free of falls  Description: INTERVENTIONS:  - Educate patient/family on patient safety including physical limitations  - Instruct patient to call for assistance with activity   - Consult OT/PT to assist with strengthening/mobility   - Keep Call bell within reach  - Keep bed low and locked with side rails adjusted as appropriate  - Keep care items and personal belongings within reach  - Initiate and maintain comfort rounds  - Make Fall Risk Sign visible to staff  - Offer Toileting every 2 Hours, in advance of need  - Initiate/Maintain bed alarm  - Obtain necessary fall risk management equipment: alarms  - Apply yellow socks and bracelet for high fall risk patients  - Consider moving patient to room near nurses station  Outcome: Progressing     Problem: PAIN - ADULT  Goal: Verbalizes/displays adequate comfort level or baseline comfort level  Description: Interventions:  - Encourage patient to monitor pain and request assistance  - Assess pain using appropriate pain scale  - Administer analgesics based on type and severity of pain and evaluate response  - Implement non-pharmacological measures as appropriate and evaluate response  - Consider cultural and social influences on pain and pain management  - Notify physician/advanced practitioner if interventions unsuccessful or patient reports new pain  Outcome: Progressing     Problem: DISCHARGE PLANNING  Goal: Discharge to home or other facility with appropriate resources  Description: INTERVENTIONS:  - Identify barriers to discharge w/patient and caregiver  - Arrange for needed discharge resources and transportation as appropriate  - Identify discharge learning needs (meds, wound care, etc )  - Refer to Case Management Department for coordinating discharge planning if the patient needs post-hospital services based on physician/advanced practitioner order or complex needs related to functional status, cognitive ability, or social support system  Outcome: Progressing     Problem: Knowledge Deficit  Goal: Patient/family/caregiver demonstrates understanding of disease process, treatment plan, medications, and discharge instructions  Description: Complete learning assessment and assess knowledge base    Interventions:  - Provide teaching at level of understanding  - Provide teaching via preferred learning methods  Outcome: Progressing     Problem: CARDIOVASCULAR - ADULT  Goal: Maintains optimal cardiac output and hemodynamic stability  Description: INTERVENTIONS:  - Monitor I/O, vital signs and rhythm  - Monitor for S/S and trends of decreased cardiac output  - Administer and titrate ordered vasoactive medications to optimize hemodynamic stability  - Assess quality of pulses, skin color and temperature  - Assess for signs of decreased coronary artery perfusion  - Instruct patient to report change in severity of symptoms  Outcome: Progressing  Goal: Absence of cardiac dysrhythmias or at baseline rhythm  Description: INTERVENTIONS:  - Continuous cardiac monitoring, vital signs, obtain 12 lead EKG if ordered  - Administer antiarrhythmic and heart rate control medications as ordered  - Monitor electrolytes and administer replacement therapy as ordered  Outcome: Progressing     Problem: RESPIRATORY - ADULT  Goal: Achieves optimal ventilation and oxygenation  Description: INTERVENTIONS:  - Assess for changes in respiratory status  - Assess for changes in mentation and behavior  - Position to facilitate oxygenation and minimize respiratory effort  - Oxygen administered by appropriate delivery if ordered  - Initiate smoking cessation education as indicated  - Encourage broncho-pulmonary hygiene including cough, deep breathe, Incentive Spirometry  - Assess the need for suctioning and aspirate as needed  - Assess and instruct to report SOB or any respiratory difficulty  - Respiratory Therapy support as indicated  Outcome: Progressing Problem: MUSCULOSKELETAL - ADULT  Goal: Maintain proper alignment of affected body part  Description: INTERVENTIONS:  - Support, maintain and protect limb and body alignment  - Provide patient/ family with appropriate education  Outcome: Progressing     Problem: MOBILITY - ADULT  Goal: Maintain or return to baseline ADL function  Description: INTERVENTIONS:  -  Assess patient's ability to carry out ADLs; assess patient's baseline for ADL function and identify physical deficits which impact ability to perform ADLs (bathing, care of mouth/teeth, toileting, grooming, dressing, etc )  - Assess/evaluate cause of self-care deficits   - Assess range of motion  - Assess patient's mobility; develop plan if impaired  - Assess patient's need for assistive devices and provide as appropriate  - Encourage maximum independence but intervene and supervise when necessary  - Involve family in performance of ADLs  - Assess for home care needs following discharge   - Consider OT consult to assist with ADL evaluation and planning for discharge  - Provide patient education as appropriate  Outcome: Progressing  Goal: Maintains/Returns to pre admission functional level  Description: INTERVENTIONS:  - Perform BMAT or MOVE assessment daily    - Set and communicate daily mobility goal to care team and patient/family/caregiver  - Collaborate with rehabilitation services on mobility goals if consulted  - Perform Range of Motion 3times a day  - Reposition patient every 2 hours    - Dangle patient 3 times a day  - Stand patient 3 times a day  - Ambulate patient 3 times a day  - Out of bed to chair 3 times a day   - Out of bed for meals 3 times a day  - Out of bed for toileting  - Record patient progress and toleration of activity level   Outcome: Progressing